# Patient Record
Sex: FEMALE | Race: WHITE | Employment: OTHER | URBAN - METROPOLITAN AREA
[De-identification: names, ages, dates, MRNs, and addresses within clinical notes are randomized per-mention and may not be internally consistent; named-entity substitution may affect disease eponyms.]

---

## 2022-01-05 ENCOUNTER — DOCUMENTATION ONLY (OUTPATIENT)
Dept: PULMONOLOGY | Age: 72
End: 2022-01-05

## 2022-01-05 NOTE — PROGRESS NOTES
Received ref from [facility]. They are requesting second opinion but they did not send records or any imaging reports. Informed Mirna that we would need all that information in order for our doctors to provide a second opinion. Ref in folder waiting for records.

## 2022-11-02 ENCOUNTER — DOCUMENTATION ONLY (OUTPATIENT)
Dept: PULMONOLOGY | Age: 72
End: 2022-11-02

## 2022-11-02 NOTE — PROGRESS NOTES
[address] from [facility] internal medicine called again. We still have not received anything from the office. She was quite rude and stated that we told her in January that we had records. She spoke with me in January and my note clearly states that we need records and imaging for a second opinion. [address] stated that she will have the patient call and see if she still wants to be seen. We still can not make an appointment without the records and imaging.